# Patient Record
Sex: FEMALE | Race: BLACK OR AFRICAN AMERICAN | NOT HISPANIC OR LATINO | Employment: UNEMPLOYED | URBAN - METROPOLITAN AREA
[De-identification: names, ages, dates, MRNs, and addresses within clinical notes are randomized per-mention and may not be internally consistent; named-entity substitution may affect disease eponyms.]

---

## 2019-06-14 PROBLEM — M54.2 NECK PAIN: Status: ACTIVE | Noted: 2019-06-14

## 2019-07-08 ENCOUNTER — TELEPHONE (OUTPATIENT)
Dept: NEUROSURGERY | Facility: CLINIC | Age: 49
End: 2019-07-08

## 2019-07-08 NOTE — TELEPHONE ENCOUNTER
Phoned pt to set up an appt. I informed pt that with medicaid pts, I would have to speak with the DrPaty When he gets back in clinic and speak with him in regards to if he wants to see pt. I informed pt that if he would like to move forward with seeing her, I will give her a call back and we can go from there. Pt understood.                                                                                                                                                                                                                                                                        ---- Message from Jadyn Dennis sent at 7/5/2019  5:41 PM CDT -----  [facility]  Appointment Request From: Arti Johnson    With Provider: Pascual moralez    Preferred Date Range: 8/5/2019 - 8/30/2019    Preferred Times: Any time    Reason for visit: Spinal stenosis    Comments:  What's the best treatment

## 2020-02-24 PROBLEM — M48.02 CERVICAL STENOSIS OF SPINAL CANAL: Status: ACTIVE | Noted: 2020-02-24

## 2020-06-15 PROBLEM — Z98.1 S/P CERVICAL SPINAL FUSION: Status: ACTIVE | Noted: 2020-06-15
